# Patient Record
Sex: MALE | Race: OTHER | NOT HISPANIC OR LATINO | URBAN - METROPOLITAN AREA
[De-identification: names, ages, dates, MRNs, and addresses within clinical notes are randomized per-mention and may not be internally consistent; named-entity substitution may affect disease eponyms.]

---

## 2019-06-15 ENCOUNTER — EMERGENCY (EMERGENCY)
Facility: HOSPITAL | Age: 39
LOS: 1 days | Discharge: HOME | End: 2019-06-15
Attending: EMERGENCY MEDICINE | Admitting: EMERGENCY MEDICINE
Payer: MEDICAID

## 2019-06-15 VITALS
DIASTOLIC BLOOD PRESSURE: 88 MMHG | HEART RATE: 83 BPM | RESPIRATION RATE: 18 BRPM | TEMPERATURE: 99 F | WEIGHT: 149.91 LBS | SYSTOLIC BLOOD PRESSURE: 130 MMHG | OXYGEN SATURATION: 96 % | HEIGHT: 70 IN

## 2019-06-15 DIAGNOSIS — S80.212A ABRASION, LEFT KNEE, INITIAL ENCOUNTER: ICD-10-CM

## 2019-06-15 PROCEDURE — 99283 EMERGENCY DEPT VISIT LOW MDM: CPT

## 2019-06-15 RX ORDER — METHOCARBAMOL 500 MG/1
750 TABLET, FILM COATED ORAL ONCE
Refills: 0 | Status: COMPLETED | OUTPATIENT
Start: 2019-06-15 | End: 2019-06-15

## 2019-06-15 RX ORDER — IBUPROFEN 200 MG
1 TABLET ORAL
Qty: 15 | Refills: 0
Start: 2019-06-15 | End: 2019-06-19

## 2019-06-15 RX ORDER — IBUPROFEN 200 MG
600 TABLET ORAL ONCE
Refills: 0 | Status: COMPLETED | OUTPATIENT
Start: 2019-06-15 | End: 2019-06-15

## 2019-06-15 RX ORDER — METHOCARBAMOL 500 MG/1
2 TABLET, FILM COATED ORAL
Qty: 20 | Refills: 0
Start: 2019-06-15 | End: 2019-06-19

## 2019-06-15 RX ADMIN — METHOCARBAMOL 750 MILLIGRAM(S): 500 TABLET, FILM COATED ORAL at 23:56

## 2019-06-15 RX ADMIN — Medication 600 MILLIGRAM(S): at 23:56
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
## 2019-06-15 NOTE — ED PROVIDER NOTE - PHYSICAL EXAMINATION
appears comfortable, talking in full sentences  pupilsa reactive, eomi,   mm moist, no oral injury, no facial pain  supple, no c/t/l spine tenderness, from, trachea in midline  Elton chest expansion, no chest wall tenderness, no rib tenderness, no deformity, no clavicular pain  S1 S2 distant  abd soft, non tender, no g/r,   no pelvic pain  moves all ext, no swelling noted, SwellingNoted on left knee. Distal to injury sensorimotor and capillary are intact.  Neuro aao3, cn intact grossly, no focal deficits  skin no bruises, no swelling, Abrasions noted on left knee,And right elbow,

## 2019-06-15 NOTE — ED PROVIDER NOTE - PROGRESS NOTE DETAILS
Patient is ambulatory in hospital he does not think he needs any x-rays. Plan to monitor patient ,Tolerating by mouth well is ambulatory to bathroom
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
## 2019-06-16 VITALS
SYSTOLIC BLOOD PRESSURE: 122 MMHG | OXYGEN SATURATION: 99 % | HEART RATE: 78 BPM | DIASTOLIC BLOOD PRESSURE: 75 MMHG | RESPIRATION RATE: 16 BRPM

## 2021-02-26 NOTE — ED ADULT TRIAGE NOTE - BP NONINVASIVE SYSTOLIC (MM HG)
Hospital Medicine Service -  Daily Progress Note       SUBJECTIVE   Interval History: No events overnight, still having some back pain. Tolerating clears. No CP, SOB, abd pain, N/V/D/C.      OBJECTIVE      Vital signs in last 24 hours:  Temp:  [36.5 °C (97.7 °F)-36.9 °C (98.4 °F)] 36.9 °C (98.4 °F)  Heart Rate:  [70-77] 77  Resp:  [17-18] 18  BP: (126-128)/(57-69) 127/69    Intake/Output Summary (Last 24 hours) at 2/26/2021 1044  Last data filed at 2/26/2021 0628  Gross per 24 hour   Intake 600 ml   Output 3090 ml   Net -2490 ml       PHYSICAL EXAMINATION      Physical Exam  Vitals signs and nursing note reviewed.   Constitutional:       General: She is not in acute distress.     Appearance: Normal appearance. She is not ill-appearing or toxic-appearing.   HENT:      Head: Normocephalic and atraumatic.      Right Ear: External ear normal.      Left Ear: External ear normal.      Nose: Nose normal.   Eyes:      General: No scleral icterus.        Right eye: No discharge.         Left eye: No discharge.      Pupils: Pupils are equal, round, and reactive to light.   Neck:      Musculoskeletal: Normal range of motion. No neck rigidity.   Cardiovascular:      Rate and Rhythm: Normal rate and regular rhythm.      Pulses: Normal pulses.      Heart sounds: Normal heart sounds. No murmur. No friction rub. No gallop.    Pulmonary:      Effort: Pulmonary effort is normal. No respiratory distress.      Breath sounds: Normal breath sounds. No stridor. No wheezing, rhonchi or rales.   Chest:      Chest wall: No tenderness.   Abdominal:      General: Abdomen is flat. Bowel sounds are normal. There is no distension.      Palpations: Abdomen is soft. There is no mass.      Tenderness: There is no abdominal tenderness. There is no guarding or rebound.      Hernia: No hernia is present.   Musculoskeletal:         General: Tenderness present. No deformity or signs of injury.      Right lower leg: No edema.      Comments: Limited ROM  due to pain in L spine region   Skin:     General: Skin is warm and dry.      Comments: Surgical site is C/D/I, drain in place.      Neurological:      General: No focal deficit present.      Mental Status: She is alert and oriented to person, place, and time. Mental status is at baseline.   Psychiatric:         Mood and Affect: Mood normal.         Behavior: Behavior normal.         Thought Content: Thought content normal.         Judgment: Judgment normal.            LINES, CATHETERS, DRAINS, AIRWAYS, AND WOUNDS   Lines, Drains, and Airways:  Wounds (agree with documentation and present on admission):  Peripheral IV 02/24/21 Left;Posterior Wrist (Active)   Number of days: 2       Drain 1 Right;Lower;Medial Back 10 Fr. (Active)   Number of days: 2       Surgical Incision Back (Active)   Number of days: 2         Comments:      LABS / IMAGING / TELE      Labs  I have reviewed the patient's labs to the time of note. No new clinical concern.    SARS CoV 2 RNA (no units)   Date/Time Value   02/19/2021 0852 NOT DETECTED     No new imaging or cardio studies.       ASSESSMENT AND PLAN      LBBB (left bundle branch block)  Assessment & Plan  Stable, no new changes on EkG.        Gastroesophageal reflux disease without esophagitis  Assessment & Plan  Stable, cont with po meds, diet as tolerated.        Essential hypertension  Assessment & Plan  Stable BP, cont to monitor, low salt diet.       * Status post lumbar spine surgery for decompression of spinal cord  Assessment & Plan  Still with pain today, drain in place draining bloody discharge  Cont with PT/OT  Ortho f/u  DVT proph  IS usage  Pain control.              VTE Assessment: Padua    VTE Prophylaxis:    Code Status: Full Code      Estimated Discharge Date: 2/27/2021   Disposition Planning: PT/OT evaluation ongoing, DC when cleared by Ortho, drain still in place.        Josesito Tillman MD  2/26/2021                130

## 2023-06-09 ENCOUNTER — EMERGENCY (EMERGENCY)
Facility: HOSPITAL | Age: 43
LOS: 1 days | Discharge: HOME | End: 2023-06-09
Attending: EMERGENCY MEDICINE | Admitting: EMERGENCY MEDICINE
Payer: COMMERCIAL

## 2023-06-09 VITALS
DIASTOLIC BLOOD PRESSURE: 80 MMHG | WEIGHT: 184.97 LBS | HEART RATE: 80 BPM | RESPIRATION RATE: 17 BRPM | TEMPERATURE: 98 F | SYSTOLIC BLOOD PRESSURE: 120 MMHG | OXYGEN SATURATION: 99 % | HEIGHT: 66 IN

## 2023-06-09 PROCEDURE — 99284 EMERGENCY DEPT VISIT MOD MDM: CPT

## 2023-06-09 NOTE — ED ADULT TRIAGE NOTE - CHIEF COMPLAINT QUOTE
Patient presents to ED with complaint of right ankle pain 2/10 on pain scale and wound x 3 days. No nausea, vomiting, dizziness, SOB or diarrhea.

## 2023-06-10 PROCEDURE — 99283 EMERGENCY DEPT VISIT LOW MDM: CPT

## 2023-06-10 RX ORDER — MUPIROCIN 20 MG/G
1 OINTMENT TOPICAL
Qty: 22 | Refills: 0
Start: 2023-06-10 | End: 2023-06-23

## 2023-06-10 RX ADMIN — Medication 1 TABLET(S): at 00:31

## 2023-06-10 NOTE — ED PROVIDER NOTE - PHYSICAL EXAMINATION
exam:   General: well appearing, NAD.   HEENT: eyes perrl,  cor: RRR, s1s2, 2+rad pulses.   lungs: ctabl, no resp distress.   abd: soft, ntnd.   neuro: a&ox3, cn2-12 intact, OWENS, 5/5 strength c nl sensation all extremities, nl coordination.   MSK: no extremity swelling.Right ankle full range of motion without pain.  2+ DP  Skin: Right ankle wound, above medial malleolus, shallow open wound about 8 x 10 mm with mild surrounding erythema, mildly increased warmth with mild tenderness.  No fluctuance.  No discharge.

## 2023-06-10 NOTE — ED PROVIDER NOTE - CLINICAL SUMMARY MEDICAL DECISION MAKING FREE TEXT BOX
42-year-old male complaining of right ankle wound pain for last 3 days.  Patient states he accidentally scraped his right ankle on a piece of metal at work 3 days ago.  No wound discharge/pus.  No fever or chills.  No nausea vomiting.  Tdap up-to-date    Patient with shallow 1 cm wound above right medial malleolus area with mild surrounding redness and mild tenderness.  No discharge.  Likely mild wound infection.  Will treat with mupirocin and Bactrim.  Follow-up PMD

## 2023-06-10 NOTE — ED ADULT NURSE NOTE - OBJECTIVE STATEMENT
Patient presents to ED complaining of right ankle pain 2/10 on pain scale and wound x 3 days. A&OX4. Pt denies nausea, vomiting, dizziness, SOB or diarrhea.

## 2023-06-10 NOTE — ED PROVIDER NOTE - PATIENT PORTAL LINK FT
You can access the FollowMyHealth Patient Portal offered by [facility] by registering at the following website: [email or web address]. By joining [facility]’s FollowMyHealth portal, you will also be able to view your health information using other applications (apps) compatible with our system.

## 2023-06-10 NOTE — ED ADULT NURSE NOTE - DISCHARGE DATE/TIME
Pt returning call, conveyed results. Pt states feeling much better after antibiotic. Encouraged follow up with PCP as needed. Pt verbalized understanding.
10-Robbie-2023 00:34

## 2023-06-10 NOTE — ED PROVIDER NOTE - NSFOLLOWUPINSTRUCTIONS_ED_ALL_ED_FT
-Take Bactrim DS antibiotic 1 tablet twice daily for 1 week  -Use mupirocin ointment twice daily for the next 2 weeks  -Take Motrin as needed for pain  -Follow-up with your primary care doctor in the next 2 to 3 days.  -Return to the ER for worse pain swelling pus fever or other concerns    Follow Up in 1-3 Days with your own doctor or with  [address] [address]  Phone: [phone]      Cellulitis    WHAT YOU NEED TO KNOW:    Cellulitis is a skin infection caused by bacteria. Cellulitis may go away on its own or you may need treatment. Your healthcare provider may draw a [place] around the outside edges of your cellulitis. If your cellulitis spreads, your healthcare provider will see it outside of the [place]. Cellulitis        DISCHARGE INSTRUCTIONS:    Call 911 if:     You have sudden trouble breathing or chest pain.      Return to the emergency department if:     Your wound gets larger and more painful.     You feel a crackling under your skin when you touch it.    You have purple dots or bumps on your skin, or you see bleeding under your skin.    You have new swelling and pain in your legs.    The red, warm, swollen area gets larger.    You see red streaks coming from the infected area.    Contact your healthcare provider if:     You have a fever.    Your fever or pain does not go away or gets worse.    The area does not get smaller after 2 days of antibiotics.    Your skin is flaking or peeling off.    You have questions or concerns about your condition or care.    Medicines:     Antibiotics help treat the bacterial infection.       NSAIDs, such as ibuprofen, help decrease swelling, pain, and fever. NSAIDs can cause stomach bleeding or kidney problems in certain people. If you take blood thinner medicine, always ask if NSAIDs are safe for you. Always read the medicine label and follow directions. Do not give these medicines to children under 6 months of age without direction from your child's healthcare provider.    Acetaminophen decreases pain and fever. It is available without a doctor's order. Ask how much to take and how often to take it. Follow directions. Read the labels of all other medicines you are using to see if they also contain acetaminophen, or ask your doctor or pharmacist. Acetaminophen can cause liver damage if not taken correctly. Do not use more than 4 grams (4,000 milligrams) total of acetaminophen in one day.     Take your medicine as directed. Contact your healthcare provider if you think your medicine is not helping or if you have side effects. Tell him or her if you are allergic to any medicine. Keep a list of the medicines, vitamins, and herbs you take. Include the amounts, and when and why you take them. Bring the list or the pill bottles to follow-up visits. Carry your medicine list with you in case of an emergency.    Self-care:     Elevate the area above the level of your heart as often as you can. This will help decrease swelling and pain. Prop the area on pillows or blankets to keep it elevated comfortably.     Clean the area daily until the wound scabs over. Gently wash the area with soap and water. Pat dry. Use dressings as directed.     Place cool or warm, wet cloths on the area as directed. Use clean cloths and clean water. Leave it on the area until the cloth is room temperature. Pat the area dry with a clean, dry cloth. The cloths may help decrease pain.     Prevent cellulitis:     Do not scratch bug bites or areas of injury. You increase your risk for cellulitis by scratching these areas.     Do not share personal items, such as towels, clothing, and razors.     Clean exercise equipment with germ-killing  before and after you use it.    Wash your hands often. Use soap and water. Wash your hands after you use the bathroom, change a child's diapers, or sneeze. Wash your hands before you prepare or eat food. Use lotion to prevent dry, cracked skin. Handwashing     Wear pressure stockings as directed. You may be told to wear the stockings if you have peripheral edema. The stockings improve blood flow and decrease swelling.    Treat athlete’s foot. This can help prevent the spread of a bacterial skin infection.    Follow up with your healthcare provider within 3 days, or as directed: Your healthcare provider will check if your cellulitis is getting better. You may need different medicine. Write down your questions so you remember to ask them during your visits.

## 2023-06-10 NOTE — ED PROVIDER NOTE - OBJECTIVE STATEMENT
42-year-old male complaining of right ankle wound pain for last 3 days.  Patient states he accidentally scraped his right ankle on a piece of metal at work 3 days ago.  No wound discharge/pus.  No fever or chills.  No nausea vomiting.  Tdap up-to-date

## 2025-04-30 ENCOUNTER — APPOINTMENT (OUTPATIENT)
Dept: GASTROENTEROLOGY | Facility: CLINIC | Age: 45
End: 2025-04-30

## 2025-04-30 PROBLEM — Z00.00 ENCOUNTER FOR PREVENTIVE HEALTH EXAMINATION: Status: ACTIVE | Noted: 2025-04-30
